# Patient Record
Sex: FEMALE | Race: BLACK OR AFRICAN AMERICAN | NOT HISPANIC OR LATINO | URBAN - METROPOLITAN AREA
[De-identification: names, ages, dates, MRNs, and addresses within clinical notes are randomized per-mention and may not be internally consistent; named-entity substitution may affect disease eponyms.]

---

## 2018-11-21 ENCOUNTER — EMERGENCY (EMERGENCY)
Facility: HOSPITAL | Age: 53
LOS: 1 days | Discharge: HOME | End: 2018-11-21
Admitting: EMERGENCY MEDICINE
Payer: COMMERCIAL

## 2018-11-21 VITALS
TEMPERATURE: 98 F | RESPIRATION RATE: 18 BRPM | DIASTOLIC BLOOD PRESSURE: 94 MMHG | HEART RATE: 84 BPM | OXYGEN SATURATION: 98 % | SYSTOLIC BLOOD PRESSURE: 153 MMHG

## 2018-11-21 PROCEDURE — 99283 EMERGENCY DEPT VISIT LOW MDM: CPT | Mod: 25

## 2018-11-21 RX ORDER — IBUPROFEN 200 MG
600 TABLET ORAL ONCE
Qty: 0 | Refills: 0 | Status: COMPLETED | OUTPATIENT
Start: 2018-11-21 | End: 2018-11-21

## 2018-11-21 RX ADMIN — Medication 600 MILLIGRAM(S): at 23:30

## 2018-11-21 NOTE — ED PROVIDER NOTE - PROGRESS NOTE DETAILS
ANIYAH Monten[place]: pt doing well, XR neg, will dc home with supportive care and aircast. pt agrees with plan.

## 2018-11-21 NOTE — ED PROVIDER NOTE - PHYSICAL EXAMINATION
R ankle/foot: mild ttp anterior aspect of ankle, FROM, NVI, sensate intact, no tenderness to metatarsals. no ecchymosis/swelling/erythema. able to ambulate with limp

## 2018-11-21 NOTE — ED PROVIDER NOTE - NSFOLLOWUPINSTRUCTIONS_ED_ALL_ED_FT
Use Aircast when walking  Apply ice to help with pain and swelling  Elevate leg when at home  Take motrin 600mg every 6 hours for pain  Follow up with your PMD

## 2018-11-21 NOTE — ED PROVIDER NOTE - OBJECTIVE STATEMENT
54 yo F denies pmhx here for right ankle pain. reports that today she was walking and twisted right ankle. reports fell but caught herself with her hands. denies other injury or trauma. denies head injury or LOC. no meds taken. denies fever chills cp sob weakness numbness tingling.

## 2018-11-21 NOTE — ED ADULT TRIAGE NOTE - CHIEF COMPLAINT QUOTE
Pt st" I fell down I think I tripped on sidewalk and went down on knees and hands....happen around 4pm. My rt ankle and foot hurts....I can not put any weight on it." Pt arrives limping to triage. Calm pleasant affect.

## 2018-11-22 PROCEDURE — 73630 X-RAY EXAM OF FOOT: CPT | Mod: 26,RT

## 2018-11-22 PROCEDURE — 73610 X-RAY EXAM OF ANKLE: CPT | Mod: 26,RT

## 2022-07-10 ENCOUNTER — EMERGENCY (EMERGENCY)
Facility: HOSPITAL | Age: 57
LOS: 1 days | Discharge: HOME | End: 2022-07-10
Attending: EMERGENCY MEDICINE | Admitting: EMERGENCY MEDICINE

## 2022-07-10 VITALS
TEMPERATURE: 98 F | HEART RATE: 97 BPM | SYSTOLIC BLOOD PRESSURE: 160 MMHG | OXYGEN SATURATION: 100 % | RESPIRATION RATE: 16 BRPM | DIASTOLIC BLOOD PRESSURE: 90 MMHG

## 2022-07-10 PROCEDURE — 99053 MED SERV 10PM-8AM 24 HR FAC: CPT

## 2022-07-10 PROCEDURE — 99284 EMERGENCY DEPT VISIT MOD MDM: CPT

## 2022-07-10 NOTE — ED ADULT TRIAGE NOTE - CHIEF COMPLAINT QUOTE
c/o headache and upper lip numbness x1day, took ASA 81mg and verbalized slight relief. No facial asymmetry, slurred speech or arm drift noted. Denies any dizziness, chest pain or SOB. no PMHX

## 2022-07-11 LAB
ALBUMIN SERPL ELPH-MCNC: 4.4 G/DL — SIGNIFICANT CHANGE UP (ref 3.3–5)
ALP SERPL-CCNC: 110 U/L — SIGNIFICANT CHANGE UP (ref 40–120)
ALT FLD-CCNC: 22 U/L — SIGNIFICANT CHANGE UP (ref 4–33)
ANION GAP SERPL CALC-SCNC: 12 MMOL/L — SIGNIFICANT CHANGE UP (ref 7–14)
AST SERPL-CCNC: 28 U/L — SIGNIFICANT CHANGE UP (ref 4–32)
BILIRUB SERPL-MCNC: 0.2 MG/DL — SIGNIFICANT CHANGE UP (ref 0.2–1.2)
BUN SERPL-MCNC: 12 MG/DL — SIGNIFICANT CHANGE UP (ref 7–23)
CALCIUM SERPL-MCNC: 9.1 MG/DL — SIGNIFICANT CHANGE UP (ref 8.4–10.5)
CHLORIDE SERPL-SCNC: 102 MMOL/L — SIGNIFICANT CHANGE UP (ref 98–107)
CO2 SERPL-SCNC: 26 MMOL/L — SIGNIFICANT CHANGE UP (ref 22–31)
CREAT SERPL-MCNC: 0.81 MG/DL — SIGNIFICANT CHANGE UP (ref 0.5–1.3)
EGFR: 85 ML/MIN/1.73M2 — SIGNIFICANT CHANGE UP
GLUCOSE SERPL-MCNC: 109 MG/DL — HIGH (ref 70–99)
HCT VFR BLD CALC: 40.9 % — SIGNIFICANT CHANGE UP (ref 34.5–45)
HGB BLD-MCNC: 12.4 G/DL — SIGNIFICANT CHANGE UP (ref 11.5–15.5)
MAGNESIUM SERPL-MCNC: 2 MG/DL — SIGNIFICANT CHANGE UP (ref 1.6–2.6)
MCHC RBC-ENTMCNC: 25.9 PG — LOW (ref 27–34)
MCHC RBC-ENTMCNC: 30.3 GM/DL — LOW (ref 32–36)
MCV RBC AUTO: 85.4 FL — SIGNIFICANT CHANGE UP (ref 80–100)
NRBC # BLD: 0 /100 WBCS — SIGNIFICANT CHANGE UP
NRBC # FLD: 0 K/UL — SIGNIFICANT CHANGE UP
PHOSPHATE SERPL-MCNC: 2.9 MG/DL — SIGNIFICANT CHANGE UP (ref 2.5–4.5)
PLATELET # BLD AUTO: 273 K/UL — SIGNIFICANT CHANGE UP (ref 150–400)
POTASSIUM SERPL-MCNC: 3.8 MMOL/L — SIGNIFICANT CHANGE UP (ref 3.5–5.3)
POTASSIUM SERPL-SCNC: 3.8 MMOL/L — SIGNIFICANT CHANGE UP (ref 3.5–5.3)
PROT SERPL-MCNC: 8.2 G/DL — SIGNIFICANT CHANGE UP (ref 6–8.3)
RBC # BLD: 4.79 M/UL — SIGNIFICANT CHANGE UP (ref 3.8–5.2)
RBC # FLD: 15 % — HIGH (ref 10.3–14.5)
SODIUM SERPL-SCNC: 140 MMOL/L — SIGNIFICANT CHANGE UP (ref 135–145)
WBC # BLD: 6.16 K/UL — SIGNIFICANT CHANGE UP (ref 3.8–10.5)
WBC # FLD AUTO: 6.16 K/UL — SIGNIFICANT CHANGE UP (ref 3.8–10.5)

## 2022-07-11 RX ORDER — METOCLOPRAMIDE HCL 10 MG
10 TABLET ORAL ONCE
Refills: 0 | Status: COMPLETED | OUTPATIENT
Start: 2022-07-11 | End: 2022-07-11

## 2022-07-11 RX ORDER — KETOROLAC TROMETHAMINE 30 MG/ML
15 SYRINGE (ML) INJECTION ONCE
Refills: 0 | Status: DISCONTINUED | OUTPATIENT
Start: 2022-07-11 | End: 2022-07-11

## 2022-07-11 RX ORDER — SODIUM CHLORIDE 9 MG/ML
1000 INJECTION INTRAMUSCULAR; INTRAVENOUS; SUBCUTANEOUS ONCE
Refills: 0 | Status: COMPLETED | OUTPATIENT
Start: 2022-07-11 | End: 2022-07-11

## 2022-07-11 RX ADMIN — Medication 10 MILLIGRAM(S): at 01:03

## 2022-07-11 RX ADMIN — SODIUM CHLORIDE 1000 MILLILITER(S): 9 INJECTION INTRAMUSCULAR; INTRAVENOUS; SUBCUTANEOUS at 01:03

## 2022-07-11 RX ADMIN — Medication 15 MILLIGRAM(S): at 01:03

## 2022-07-11 NOTE — ED ADULT NURSE NOTE - OBJECTIVE STATEMENT
Problem: Patient Care Overview  Goal: Plan of Care Review  Outcome: Ongoing (interventions implemented as appropriate)  Flowsheets (Taken 2020 2081)  Progress: no change  Outcome Summary: OT eval completed.  Infant has muscle tone, reflexes, movement patterns and neurobehavior appropriate for PMA.  Infant engages in NNS for self regulation but demonstrates a disorganized sucking pattern at times.  Parents were not present for eval.  Infant was not PO fed d/t need to start peripheral IV.  Skilled OT will continue to treat infant to address decreased PO feeding skills, parent education, and to provide developmentally appropriate interventions and education.  Care Plan Reviewed With: other (see comments)      patient aaox3. ambulatory. came in with headache located in the left frontal region of forehead. intermittent pain since yesterday. denies dizziness, weakness, lightheadedness, numbness, tingling, changes to vision. iv started to right ac #20g. labs drawn and sent. medicated as ordered. Will continue to monitor

## 2022-07-11 NOTE — ED PROVIDER NOTE - PATIENT PORTAL LINK FT
You can access the FollowMyHealth Patient Portal offered by [facility] by registering at the following website: [email or web address]. By joining [facility]’s FollowMyHealth portal, you will also be able to view your health information using other applications (apps) compatible with our system.

## 2022-07-11 NOTE — ED PROVIDER NOTE - OBJECTIVE STATEMENT
58 yo with PMH asthma presenting with a HA 6/10 severity and located to the L side of the frontal head that does not radiate.  Started last night and is similar to previous HA that she has had in the past often related to her sinuses.  Different this time however though she is experiencing a paresthesia of a numbness to the L upper lip region.  It has not changed or radiated since it started last night.  NO weakness, gait problems or speech issues.

## 2022-07-11 NOTE — ED PROVIDER NOTE - CLINICAL SUMMARY MEDICAL DECISION MAKING FREE TEXT BOX
pt presenting wit headache and a facial paresthesia.  It is not consistent withe a CVA in the setting of there is no specific distribution that is so isolated.  Neuro exam is completely normal and her numbness is subjective.  HA with no red flags for SAH.  Will check lytes as possible etiology as well as treat pain to see if that improves her symptoms.  *The above represents an initial assessment/impression. Please refer to progress notes for potential changes in patient clinical course*

## 2022-07-11 NOTE — ED PROVIDER NOTE - PHYSICAL EXAMINATION
Vitals: I have reviewed the patients vital signs  General: nontoxic appearing  HEENT: Atraumatic, normocephalic, airway patent  Eyes: EOMI, tracking appropriately  Neck: no tracheal deviation  Chest/Lungs: no trauma, symmetric chest rise, speaking in complete sentences,  no resp distress  Heart: skin and extremities well perfused, regular rate and rhythm  Neuro: A+Ox3, ambulating without difficulty, appears non focal, CN II-XII intact  MSK: strength at baseline in all extremities, no muscle wasting or atrophy  Skin: no cyanosis, no jaundice

## 2022-07-11 NOTE — ED PROVIDER NOTE - NSFOLLOWUPINSTRUCTIONS_ED_ALL_ED_FT
Headache    A headache is pain or discomfort felt around the head or neck area. The specific cause of a headache may not be found as there are many types including tension headaches, migraine headaches, and cluster headaches. Watch your condition for any changes. Things you can do to manage your pain include taking over the counter and prescription medications as instructed by your health care provider, lying down in a dark quiet room, limiting stress, getting regular sleep, and refraining from alcohol and tobacco products.    SEEK IMMEDIATE MEDICAL CARE IF YOU HAVE ANY OF THE FOLLOWING SYMPTOMS: fever, vomiting, stiff neck, loss of vision, problems with speech, muscle weakness, loss of balance, trouble walking, passing out, or confusion.       Paresthesia      Paresthesia is an abnormal burning or prickling sensation. It is usually felt in the hands, arms, legs, or feet. However, it may occur in any part of the body. Usually, paresthesia is not painful. It may feel like:  •Tingling or numbness.      •Buzzing.      •Itching.      Paresthesia may occur without any clear cause, or it may be caused by:  •Breathing too quickly (hyperventilation).      •Pressure on a nerve.      •An underlying medical condition.      •Side effects of a medication.      •Nutritional deficiencies.      •Exposure to toxic chemicals.      Most people experience temporary (transient) paresthesia at some time in their lives. For some people, it may be long-lasting (chronic) because of an underlying medical condition. If you have paresthesia that lasts a long time, you need to be evaluated by your health care provider.      Follow these instructions at home:      Alcohol use    • Do not drink alcohol if:  •Your health care provider tells you not to drink.      •You are pregnant, may be pregnant, or are planning to become pregnant.      •If you drink alcohol:•Limit how much you use to:  •0–1 drink a day for women.      •0–2 drinks a day for men.        •Be aware of how much alcohol is in your drink. In the [place] one drink equals one 12 oz bottle of beer (355 mL), one 5 oz glass of wine (148 mL), or one 1½ oz glass of hard liquor (44 mL).          Nutrition    •Eat a healthy diet. This includes:  •Eating foods that are high in fiber, such as fresh fruits and vegetables, whole grains, and beans.      •Limiting foods that are high in fat and processed sugars, such as fried or sweet foods.        General instructions     •Take over-the-counter and prescription medicines only as told by your health care provider.      • Do not use any products that contain nicotine or tobacco, such as cigarettes and e-cigarettes. These can keep blood from reaching damaged nerves. If you need help quitting, ask your health care provider.      •If you have diabetes, work closely with your health care provider to keep your blood sugar under control.    •If you have numbness in your feet:  •Check every day for signs of injury or infection. Watch for redness, warmth, and swelling.      •Wear padded socks and comfortable shoes. These help protect your feet.        •Keep all follow-up visits as told by your health care provider. This is important.        Contact a health care provider if you:    •Have paresthesia that gets worse or does not go away.      •Have numbness after an injury.      •Have a burning or prickling feeling that gets worse when you walk.      •Have pain, cramps, or dizziness, or you faint.      •Develop a rash.        Get help right away if you:    •Feel muscle weakness.      •Develop new weakness in an arm or leg.      •Have trouble walking or moving.      •Have problems with speech, understanding, or vision.      •Feel confused.      •Cannot control your bladder or bowel movements.        Summary    •Paresthesia is an abnormal burning or prickling sensation that is usually felt in the hands, arms, legs, or feet. It may also occur in other parts of the body.      •Paresthesia may occur without any clear cause, or it may be caused by breathing too quickly (hyperventilation), pressure on a nerve, an underlying medical condition, side effects of a medication, nutritional deficiencies, or exposure to toxic chemicals.      •If you have paresthesia that lasts a long time, you need to be evaluated by your health care provider.      This information is not intended to replace advice given to you by your health care provider. Make sure you discuss any questions you have with your health care provider.

## 2022-07-11 NOTE — ED PROVIDER NOTE - PRINCIPAL DIAGNOSIS
Headache [Negative] : Integumentary [Fatigue] : no fatigue [Decreased Appetite] : appetite not decreased [Recent Weight Gain (___ Lbs)] : no recent weight gain [Recent Weight Loss (___ Lbs)] : no recent weight loss [Fever] : no fever [Chills] : no chills [Blurry Vision] : no blurred vision [Chest Pain] : no chest pain [Palpitations] : no palpitations [Heart Rate Is Slow] : the heart rate was not slow [Heart Rate Is Fast] : the heart rate was not fast [Lower Ext Edema] : no lower extremity edema [Wheezing] : no wheezing was heard [Shortness Of Breath] : no shortness of breath [Cough] : no cough

## 2022-07-11 NOTE — ED PROVIDER NOTE - PROGRESS NOTE DETAILS
Symptoms are improved at this time.  Lab workups are negative for acute findings.    Spoke with patient extensively regarding current differential diagnosis for ongoing symptoms, and patient acknowledged understanding. All questions and concerns have been addressed with the patient. I have discussed the plan for care and patient is in agreement. Patient is instructed to follow up with Primary Care Provider, and has been given strict return precautions.

## 2022-07-11 NOTE — ED PROVIDER NOTE - PROGRESS NOTE
Improved. Eye Protection Verbiage: Before proceeding with the stage, a plastic scleral shield was inserted. The globe was anesthetized with a few drops of 1% lidocaine with 1:100,000 epinephrine. Then, an appropriate sized scleral shield was chosen and coated with lacrilube ointment. The shield was gently inserted and left in place for the duration of each stage. After the stage was completed, the shield was gently removed.

## 2022-08-12 NOTE — ED ADULT NURSE NOTE - TEMPLATE
Patients mom was calling to see if she could get a school physical form filled out by the doctor she said she could come pick it up patient will be going to 6th grade and they just need it to show the school that she is up to date with vaccines   Please call mom when form is ready   
Spoke to mother.  She already has a px form from another physician's office; visit was in January 2022.  Mother is requesting an updated vaccine record that includes that vaccines given in our office. [employer] printed and left at  for pick-up.  
General

## 2025-08-13 ENCOUNTER — EMERGENCY (EMERGENCY)
Facility: HOSPITAL | Age: 60
LOS: 1 days | End: 2025-08-13
Attending: EMERGENCY MEDICINE | Admitting: EMERGENCY MEDICINE
Payer: COMMERCIAL

## 2025-08-13 VITALS
SYSTOLIC BLOOD PRESSURE: 180 MMHG | HEART RATE: 92 BPM | OXYGEN SATURATION: 97 % | DIASTOLIC BLOOD PRESSURE: 78 MMHG | TEMPERATURE: 98 F | RESPIRATION RATE: 18 BRPM

## 2025-08-13 PROCEDURE — 93010 ELECTROCARDIOGRAM REPORT: CPT

## 2025-08-13 PROCEDURE — 71045 X-RAY EXAM CHEST 1 VIEW: CPT | Mod: 26

## 2025-08-13 PROCEDURE — 99284 EMERGENCY DEPT VISIT MOD MDM: CPT

## 2025-08-13 RX ORDER — PREDNISONE 20 MG/1
10 TABLET ORAL ONCE
Refills: 0 | Status: COMPLETED | OUTPATIENT
Start: 2025-08-13 | End: 2025-08-13

## 2025-08-13 RX ORDER — PREDNISONE 20 MG/1
2 TABLET ORAL
Qty: 8 | Refills: 0
Start: 2025-08-13 | End: 2025-08-16

## 2025-08-13 RX ADMIN — Medication 1 DOSE(S): at 14:02

## 2025-08-13 RX ADMIN — PREDNISONE 10 MILLIGRAM(S): 20 TABLET ORAL at 14:02
